# Patient Record
Sex: MALE
[De-identification: names, ages, dates, MRNs, and addresses within clinical notes are randomized per-mention and may not be internally consistent; named-entity substitution may affect disease eponyms.]

---

## 2018-04-09 ENCOUNTER — HOSPITAL ENCOUNTER (EMERGENCY)
Dept: HOSPITAL 31 - C.ER | Age: 58
Discharge: HOME | End: 2018-04-09
Payer: MEDICAID

## 2018-04-09 VITALS — BODY MASS INDEX: 31.9 KG/M2

## 2018-04-09 VITALS — RESPIRATION RATE: 18 BRPM

## 2018-04-09 VITALS — SYSTOLIC BLOOD PRESSURE: 99 MMHG | DIASTOLIC BLOOD PRESSURE: 66 MMHG | HEART RATE: 71 BPM | TEMPERATURE: 98.1 F

## 2018-04-09 VITALS — OXYGEN SATURATION: 99 %

## 2018-04-09 DIAGNOSIS — S60.511A: ICD-10-CM
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
## 2018-04-09 DIAGNOSIS — S80.212A: Primary | ICD-10-CM
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
## 2018-04-09 NOTE — C.PDOC
History Of Present Illness


57 year old male presents to the ED for evaluation of knee pain and multiple 

abrasions. Patient reports he tripped on uneven pavement which made him fall 

onto his hands and knees. Patient noted having abrasion on his left knee and 

right hand. Patient denies head injury, headache, LOC, dizziness, weakness, 

numbness.


Time Seen by Provider: 04/09/18 11:03


Chief Complaint (Nursing): Lower Extremity Problem/Injury


History Per: Patient


History/Exam Limitations: no limitations


Onset/Duration Of Symptoms: Hrs


Current Symptoms Are (Timing): Still Present


Recent travel outside of the [place]: No


Additional History Per: Patient





- Knee


Description Of Injury: Fell





Past Medical History


Reviewed: Historical Data, Nursing Documentation, Vital Signs


Vital Signs: 


 Last Vital Signs











Temp  98.1 F   04/09/18 12:14


 


Pulse  71   04/09/18 12:14


 


Resp  18   04/09/18 12:14


 


BP  99/66 L  04/09/18 12:14


 


Pulse Ox  99   04/09/18 12:50














- Medical History


PMH: Anxiety, Asthma, Depression


   Denies: Diabetes, Hepatitis, HIV, HTN, Seizures, Sexually Transmitted Disease


Surgical History: No Surg Hx





- [facility] Procedures








CENTRAL VENOUS CATHETER PLACEMENT WITH GUIDANCE (10/02/13)


DRESSING OF WOUND NEC (10/02/13)


EXCIS DEBRIDE OF WOUND, INFECT, OR BURN (10/02/13)


NONEXCIS DEBRID OF WOUND, INFECT, OR BURN (10/02/13)


OTHER SKIN & SUBQ I   D (10/02/13)


SKIN REPAIR & PLASTY NEC (10/02/13)








Family History: States: Unknown Family Hx





- Social History


Hx Tobacco Use: Yes


Hx Alcohol Use: Yes (denies)


Hx Substance Use: No





- Immunization History


Hx Tetanus Toxoid Vaccination: No


Hx Influenza Vaccination: No


Hx Pneumococcal Vaccination: No





Review Of Systems


Constitutional: Negative for: Fever, Chills


Cardiovascular: Negative for: Chest Pain


Respiratory: Negative for: Shortness of Breath


Gastrointestinal: Negative for: Abdominal Pain


Musculoskeletal: Positive for: Hand Pain, Leg Pain


Skin: Negative for: Rash


Neurological: Negative for: Headache, Dizziness





Physical Exam





- Physical Exam


Appears: Non-toxic, No Acute Distress


Skin: Normal Color, Warm, Dry, Other (abrasion to right palm and proximal left 

knee, covered by pt with liquid bandage)


Head: Atraumatic, Normacephalic


Eye(s): bilateral: Normal Inspection


Neck: Normal ROM, No Midline Cervical Tenderness, Supple


Extremity: Normal ROM (B/L arms, hands, wrists), Tenderness (left patella), 

Capillary Refill (< 2 seconds), No Swelling, Other (abrasion hypothenar 

eminence or right hand. Abrasion pf proximal left patella)


Pulses: Left Radial: Normal, Right Radial: Normal, Left Dorsalis Pedis: Normal, 

Right Dorsalis Pedis: Normal


Neurological/Psych: Oriented x3, Normal Motor, Normal Sensation


Gait: Steady





ED Course And Treatment


O2 Sat by Pulse Oximetry: 99 (ON RA)


Pulse Ox Interpretation: Normal





- Other Rad


  ** Left Knee X-Ray


X-Ray: Viewed By Me, Read By Radiologist


Interpretation: PROCEDURE:  Left Knee Radiographs.  HISTORY:  Pain.  COMPARISON

:  None.  FINDINGS:  BONES:  There is no acute displaced fracture or bone 

destruction.  Bone alignment and mineralization are normal.  JOINTS:  There is 

mild degenerative osteoarthrosis in the medial compartment with mild reduced 

joint space, marginal spurring and tibial spiking.  JOINT EFFUSION:  None.  

OTHER FINDINGS:  None.  IMPRESSION:  No acute fracture or dislocation.  Mild 

degenerative osteoarthrosis in the medial compartment.





Medical Decision Making


Medical Decision Making: 


Impression: left knee pain, multiple abrasion s/p falling


Plan:


* tetanus update 


* Left Knee X-Ray


* Motrin 600 mg PO











Disposition


Counseled Patient/Family Regarding: Studies Performed, Diagnosis, Need For 

Followup, Rx Given





- Disposition


Referrals: 


Dayton Ruiz MD [Medical Doctor] - 


Disposition: HOME/ ROUTINE


Disposition Time: 12:26


Condition: GOOD


Additional Instructions: 


Keep abrasions clean and dry.  Take ibuprofen if needed for pain. Follow up 

with your PMD in a few days. Return for any worse symptoms. 


Prescriptions: 


Ibuprofen [Motrin] 600 mg PO TID #30 tab


Instructions:  Skin Abrasions (DC)


Forms:  [facility] (English), General Discharge Instructions





- Clinical Impression


Clinical Impression: 


 Fall from slip, trip, or stumble, Multiple abrasions, Knee injury








- PA / NP / Resident Statement


MD/DO has reviewed & agrees with the documentation as recorded.





- Scribe Statement


The provider has reviewed the documentation as recorded by the Scribe


Gabe Burciaga





All medical record entries made by the Scribe were at my direction and 

personally dictated by me. I have reviewed the chart and agree that the record 

accurately reflects my personal performance of the history, physical exam, 

medical decision making, and the department course for this patient. I have 

also personally directed, reviewed, and agree with the discharge instructions 

and disposition.

## 2018-04-09 NOTE — RAD
PROCEDURE:  Left Knee Radiographs.



HISTORY:

Pain.



COMPARISON:

None.



FINDINGS:



BONES:

There is no acute displaced fracture or bone destruction.  Bone 

alignment and mineralization are normal. 



JOINTS:

There is mild degenerative osteoarthrosis in the medial compartment 

with mild reduced joint space, marginal spurring and tibial spiking. 



JOINT EFFUSION:

None. 



OTHER FINDINGS:

None.



IMPRESSION:

No acute fracture or dislocation. 



Mild degenerative osteoarthrosis in the medial compartment.